# Patient Record
Sex: MALE | Race: BLACK OR AFRICAN AMERICAN
[De-identification: names, ages, dates, MRNs, and addresses within clinical notes are randomized per-mention and may not be internally consistent; named-entity substitution may affect disease eponyms.]

---

## 2019-01-10 ENCOUNTER — HOSPITAL ENCOUNTER (OUTPATIENT)
Dept: HOSPITAL 61 - ECHO | Age: 59
Discharge: HOME | End: 2019-01-10
Attending: PHYSICIAN ASSISTANT
Payer: COMMERCIAL

## 2019-01-10 DIAGNOSIS — I37.1: Primary | ICD-10-CM

## 2019-01-10 DIAGNOSIS — I10: ICD-10-CM

## 2019-01-10 PROCEDURE — 93306 TTE W/DOPPLER COMPLETE: CPT

## 2019-01-10 NOTE — CARD
MR#: [id number]

Account#: [id number]

Accession#: [id number]

Date of Study: 01/10/2019

Ordering Physician: NOBLE BELL, 

Referring Physician: NOBLE BELL, 

Tech: Digna Joe





--------------- APPROVED REPORT --------------





EXAM: Two-dimensional and M-mode echocardiogram with Doppler and color Doppler.



Other Information 

Quality : GoodHR: 67bpm



INDICATION

Murmur 



RISK FACTORS

Hypertension 



2D DIMENSIONS 

RVDd3.4 (2.9-3.5cm)Left Atrium(2D)3.5 (1.6-4.0cm)

IVSd1.5 (0.7-1.1cm)Aortic Root(2D)3.7 (2.0-3.7cm)

LVDd5.2 (3.9-5.9cm)LVOT Diameter2.3 (1.8-2.4cm)

PWd1.1 (0.7-1.1cm)LVDs2.4 (2.5-4.0cm)

FS (%) 53.5 %.6 ml

LVEF(%)84.1 (>50%)



Aortic Valve

AoV Peak Adam.130.3cm/sAoV VTI20.8cm

AO Peak GR.6.8mmHgLVOT Peak Adam.75.0cm/s

LVOT  VTI 15.03cmAO Mean GR.3mmHg

BRANDEN (VMAX)1.07kn6VXT   (VTI)3.12cm2



Mitral Valve

MV E Zudrlpzm78.1cm/sMV DECEL [id number]

MV A Aldopnfx03.1cm/sMV [id number]

E/A  Ratio1.2MVA (PHT)3.87cm2



TDI

E/Lateral E'5.1E/Medial E'7.2



Pulmonary Valve

PV Peak Lodaghbq580.6cm/sPV Peak Grad.9mmHg



Tricuspid Valve

RAP [id number]



Pulmonary Vein

S1 Ujhoyqmg74.4cm/sD2 Dxinyojl65.0cm/s

PVa [id number]



 LEFT VENTRICLE 

The left ventricle is normal size. There is mild to moderate concentric left ventricular hypertrophy.
 The left ventricular systolic function is normal and the ejection fraction is within normal range. T
he Ejection Fraction is >55%. There is normal LV segmental wall motion. The left ventricular diastoli
c function and filling is normal for age.



 RIGHT VENTRICLE 

The right ventricle is normal size. There is normal right ventricular wall thickness. The right ventr
icular systolic function is normal.



 ATRIA 

The left atrium size is normal. The right atrium size is normal. The interatrial septum is intact wit
h no evidence for an atrial septal defect or patent foramen ovale as noted on 2-D or Doppler imaging.




 AORTIC VALVE 

The aortic valve is normal in structure and function. Doppler and Color Flow revealed no significant 
aortic regurgitation. There is no significant aortic valvular stenosis.



 MITRAL VALVE 

The mitral valve is thickened but opens well. There is no evidence of mitral valve prolapse. There is
 no mitral valve stenosis. Doppler and Color Flow revealed no mitral valve regurgitation noted.



 TRICUSPID VALVE 

The tricuspid valve is normal in structure and function. Doppler and Color Flow revealed no tricuspid
 valve regurgitation noted. There is no tricuspid valve stenosis.



 PULMONIC VALVE 

The pulmonic valve is not well visualized. Doppler and Color Flow revealed mild pulmonic valvular reg
urgitation.



 GREAT VESSELS 

The aortic root is normal in size. The ascending aorta is normal in size. The IVC was not well visual
ized.



 PERICARDIAL EFFUSION 

There is no evidence of significant pericardial effusion.



Critical Notification

Critical Value: No



<Conclusion>

The left ventricular systolic function is normal and the ejection fraction is within normal range. Th
e Ejection Fraction is >55%.

There is normal LV segmental wall motion.



Signed by : Emory Barger, 

Electronically Approved : 01/10/2019 16:10:38